# Patient Record
Sex: FEMALE | Race: ASIAN | URBAN - METROPOLITAN AREA
[De-identification: names, ages, dates, MRNs, and addresses within clinical notes are randomized per-mention and may not be internally consistent; named-entity substitution may affect disease eponyms.]

---

## 2022-10-04 ENCOUNTER — EMERGENCY (EMERGENCY)
Facility: HOSPITAL | Age: 27
LOS: 1 days | Discharge: HOME | End: 2022-10-04
Admitting: EMERGENCY MEDICINE

## 2022-10-04 VITALS
SYSTOLIC BLOOD PRESSURE: 101 MMHG | DIASTOLIC BLOOD PRESSURE: 67 MMHG | RESPIRATION RATE: 18 BRPM | TEMPERATURE: 98 F | HEART RATE: 95 BPM | OXYGEN SATURATION: 100 %

## 2022-10-04 VITALS
SYSTOLIC BLOOD PRESSURE: 123 MMHG | OXYGEN SATURATION: 98 % | WEIGHT: 149.91 LBS | TEMPERATURE: 99 F | DIASTOLIC BLOOD PRESSURE: 87 MMHG | RESPIRATION RATE: 18 BRPM | HEART RATE: 95 BPM

## 2022-10-04 LAB
ALBUMIN SERPL ELPH-MCNC: 3.7 G/DL — SIGNIFICANT CHANGE UP (ref 3.4–5)
ALP SERPL-CCNC: 64 U/L — SIGNIFICANT CHANGE UP (ref 40–120)
ALT FLD-CCNC: 10 U/L — LOW (ref 12–42)
ANION GAP SERPL CALC-SCNC: 8 MMOL/L — LOW (ref 9–16)
APPEARANCE UR: CLEAR — SIGNIFICANT CHANGE UP
AST SERPL-CCNC: 14 U/L — LOW (ref 15–37)
BACTERIA # UR AUTO: PRESENT /HPF
BASOPHILS # BLD AUTO: 0.02 K/UL — SIGNIFICANT CHANGE UP (ref 0–0.2)
BASOPHILS NFR BLD AUTO: 0.4 % — SIGNIFICANT CHANGE UP (ref 0–2)
BILIRUB SERPL-MCNC: 0.9 MG/DL — SIGNIFICANT CHANGE UP (ref 0.2–1.2)
BILIRUB UR-MCNC: NEGATIVE — SIGNIFICANT CHANGE UP
BUN SERPL-MCNC: 13 MG/DL — SIGNIFICANT CHANGE UP (ref 7–23)
CALCIUM SERPL-MCNC: 8.8 MG/DL — SIGNIFICANT CHANGE UP (ref 8.5–10.5)
CHLORIDE SERPL-SCNC: 104 MMOL/L — SIGNIFICANT CHANGE UP (ref 96–108)
CO2 SERPL-SCNC: 26 MMOL/L — SIGNIFICANT CHANGE UP (ref 22–31)
COLOR SPEC: YELLOW — SIGNIFICANT CHANGE UP
CREAT SERPL-MCNC: 0.88 MG/DL — SIGNIFICANT CHANGE UP (ref 0.5–1.3)
DIFF PNL FLD: ABNORMAL
EGFR: 93 ML/MIN/1.73M2 — SIGNIFICANT CHANGE UP
EOSINOPHIL # BLD AUTO: 0.06 K/UL — SIGNIFICANT CHANGE UP (ref 0–0.5)
EOSINOPHIL NFR BLD AUTO: 1.1 % — SIGNIFICANT CHANGE UP (ref 0–6)
EPI CELLS # UR: SIGNIFICANT CHANGE UP /HPF (ref 0–5)
GLUCOSE SERPL-MCNC: 105 MG/DL — HIGH (ref 70–99)
GLUCOSE UR QL: NEGATIVE — SIGNIFICANT CHANGE UP
HCG SERPL-ACNC: 1 MIU/ML — SIGNIFICANT CHANGE UP
HCT VFR BLD CALC: 41.8 % — SIGNIFICANT CHANGE UP (ref 34.5–45)
HGB BLD-MCNC: 14.1 G/DL — SIGNIFICANT CHANGE UP (ref 11.5–15.5)
IMM GRANULOCYTES NFR BLD AUTO: 0.2 % — SIGNIFICANT CHANGE UP (ref 0–0.9)
KETONES UR-MCNC: NEGATIVE — SIGNIFICANT CHANGE UP
LACTATE SERPL-SCNC: 0.9 MMOL/L — SIGNIFICANT CHANGE UP (ref 0.4–2)
LEUKOCYTE ESTERASE UR-ACNC: ABNORMAL
LIDOCAIN IGE QN: 92 U/L — SIGNIFICANT CHANGE UP (ref 73–393)
LYMPHOCYTES # BLD AUTO: 1.37 K/UL — SIGNIFICANT CHANGE UP (ref 1–3.3)
LYMPHOCYTES # BLD AUTO: 24.6 % — SIGNIFICANT CHANGE UP (ref 13–44)
MCHC RBC-ENTMCNC: 31.5 PG — SIGNIFICANT CHANGE UP (ref 27–34)
MCHC RBC-ENTMCNC: 33.7 GM/DL — SIGNIFICANT CHANGE UP (ref 32–36)
MCV RBC AUTO: 93.3 FL — SIGNIFICANT CHANGE UP (ref 80–100)
MONOCYTES # BLD AUTO: 0.39 K/UL — SIGNIFICANT CHANGE UP (ref 0–0.9)
MONOCYTES NFR BLD AUTO: 7 % — SIGNIFICANT CHANGE UP (ref 2–14)
NEUTROPHILS # BLD AUTO: 3.71 K/UL — SIGNIFICANT CHANGE UP (ref 1.8–7.4)
NEUTROPHILS NFR BLD AUTO: 66.7 % — SIGNIFICANT CHANGE UP (ref 43–77)
NITRITE UR-MCNC: POSITIVE
NRBC # BLD: 0 /100 WBCS — SIGNIFICANT CHANGE UP (ref 0–0)
PH UR: 6 — SIGNIFICANT CHANGE UP (ref 5–8)
PLATELET # BLD AUTO: 328 K/UL — SIGNIFICANT CHANGE UP (ref 150–400)
POTASSIUM SERPL-MCNC: 3.6 MMOL/L — SIGNIFICANT CHANGE UP (ref 3.5–5.3)
POTASSIUM SERPL-SCNC: 3.6 MMOL/L — SIGNIFICANT CHANGE UP (ref 3.5–5.3)
PROT SERPL-MCNC: 7.6 G/DL — SIGNIFICANT CHANGE UP (ref 6.4–8.2)
PROT UR-MCNC: 30 MG/DL
RBC # BLD: 4.48 M/UL — SIGNIFICANT CHANGE UP (ref 3.8–5.2)
RBC # FLD: 11.9 % — SIGNIFICANT CHANGE UP (ref 10.3–14.5)
RBC CASTS # UR COMP ASSIST: ABNORMAL /HPF
SODIUM SERPL-SCNC: 138 MMOL/L — SIGNIFICANT CHANGE UP (ref 132–145)
SP GR SPEC: <=1.005 — SIGNIFICANT CHANGE UP (ref 1–1.03)
UROBILINOGEN FLD QL: 0.2 E.U./DL — SIGNIFICANT CHANGE UP
WBC # BLD: 5.56 K/UL — SIGNIFICANT CHANGE UP (ref 3.8–10.5)
WBC # FLD AUTO: 5.56 K/UL — SIGNIFICANT CHANGE UP (ref 3.8–10.5)
WBC UR QL: ABNORMAL /HPF

## 2022-10-04 PROCEDURE — 99285 EMERGENCY DEPT VISIT HI MDM: CPT

## 2022-10-04 PROCEDURE — 74177 CT ABD & PELVIS W/CONTRAST: CPT | Mod: 26,59

## 2022-10-04 RX ORDER — NITROFURANTOIN MACROCRYSTAL 50 MG
1 CAPSULE ORAL
Qty: 10 | Refills: 0
Start: 2022-10-04 | End: 2022-10-08

## 2022-10-04 RX ORDER — CEFTRIAXONE 500 MG/1
1000 INJECTION, POWDER, FOR SOLUTION INTRAMUSCULAR; INTRAVENOUS ONCE
Refills: 0 | Status: COMPLETED | OUTPATIENT
Start: 2022-10-04 | End: 2022-10-04

## 2022-10-04 RX ORDER — SODIUM CHLORIDE 9 MG/ML
1000 INJECTION INTRAMUSCULAR; INTRAVENOUS; SUBCUTANEOUS ONCE
Refills: 0 | Status: COMPLETED | OUTPATIENT
Start: 2022-10-04 | End: 2022-10-04

## 2022-10-04 RX ADMIN — SODIUM CHLORIDE 1000 MILLILITER(S): 9 INJECTION INTRAMUSCULAR; INTRAVENOUS; SUBCUTANEOUS at 12:14

## 2022-10-04 RX ADMIN — CEFTRIAXONE 100 MILLIGRAM(S): 500 INJECTION, POWDER, FOR SOLUTION INTRAMUSCULAR; INTRAVENOUS at 14:22

## 2022-10-04 NOTE — ED PROVIDER NOTE - PROGRESS NOTE DETAILS
Neg ABD ct  UA shows positive nitrated w/ 5-10 WBCs  Will treat for UTI - macrobid RX sent  Return precautions discussed  Pt stable on DC Neg ABD ct  UA shows positive nitrated w/ 5-10 WBCs  Will treat for UTI, first dose of CTX given here - macrobid RX sent  Return precautions discussed  Pt stable on DC

## 2022-10-04 NOTE — ED PROVIDER NOTE - CLINICAL SUMMARY MEDICAL DECISION MAKING FREE TEXT BOX
If you have any concerns following your Procedure/Surgery and cannot reach your physician's office, please contact [facility] at [phone] for assistance.        Care After Anesthesia or Sedation    After discharge  • Due to the medicine given, someone must drive you home. It is strongly recommended to have someone stay with you at home the day of discharge and the night after surgery.  • If you have infants or small children at home, please have someone help you for at least 24 hours after your surgery.  • Do not drive for at least 24 hours after surgery (or as told by your doctor).  • Rest for the remainder of the day. Go up and down stairs slowly.  • Do not smoke after surgery. Smoking can delay healing.  • Do not operate heavy or potential harmful equipment.  • Do not make legally binding decisions.  • Do not drink alcohol for 24 hours.    Diet   • Drink plenty of fluids (6 to 8 glasses of water a day).  • Resume your regular diet as able.  • Avoid greasy or spicy foods for 24 hours.   • Start eating a bland diet (toast, gelatin, 7-up, hot cereal, crackers, sherbet, broth soup).      Nausea   • Nausea may be expected for the first 24 to 48 hours.  • Drink small amounts of fluids such as water or sports drink  • Try sucking on hard candy      Urination  • The effects of anesthetics may cause some people to have trouble passing urine the day of surgery. Drink a lot of fluids to help prevent this.  • Try to urinate within 8 hours of surgery.  • If you are unable to pass urine and feel like you need to, call your doctor or the hospital.    Pain control  • Some incisions are injected with a long acting local anesthetic; it will wear off in 4 to 6 hours. You can expect to have some pain at this time.  • Treat your pain with the prescribed pain medicine before it wears off. Do not wait until your pain becomes severe.  • Ask your nurse when you had your last pain medicine, so you know when you can  take another one after you get home.    Call your doctor if you have:  • Nausea and vomiting that does not stop  • Fever over 101° F  • Pain not relieved by pain medication  • If you are unable to pass your urine or you have not passed urine in the last 8 hours.  • Unusual changes in behavior  • Dizziness  • Hives  If you are not able to reach your doctor, you may call the emergency department.    Wound Healing: What You Should Know    Do I have an infection?  Your body may show signs your wound is infected. If you see one or more of these signs, please call your health care provider:  • Redness and swelling - Increased redness and swelling around the wound (some redness is expected in the first 48 hours).  • Warmth - The area around the wound is warmer than the surrounding skin.  • Fever - Your temperature is above 101º F or stays above 100º F.  • Odor - A new or stronger, sweet or foul smell coming from the wound.  • Swelling - Swelling spreading to other areas.  • Drainage - Large amounts of drainage that involves blood, clear fluid or pus from the wound. Or, the drainage amount increases or changes color. (It is normal to have a small amount of drainage.)  • Pain - Increase in your pain or change in the location of the pain.  • Separation - Any place where the incision is  or opening.    How can I help prevent the spread of infection when I take care of my wound?  1. Use good handwashing techniques before and after contact with your wound. Here are the steps to follow:  Handwashing with soap and water:  • Wet hands with warm water and apply soap.  • Rub well over all surfaces for at least 15 seconds.  • Rinse well under water.  • Dry hands with clean towels.  • Turn off faucet with clean towels to prevent reinfecting hands.  Handwashing with a waterless alcohol-based product or gel:  • Apply approximately a nickel sized amount of product to palm of hand.  • Rub hands together, covering all surfaces  including nails, until product evaporates, about 10 to 15 seconds.  • Use clean latex or vinyl gloves if cleaning or touching wound surface.  • Keep nails short and remove nail polish. Long nails or polish can harbor bacteria.  • Keep jewelry clean or remove during wound care.  • Use hypoallergenic lotions with anti-bacterial agents on skin surrounding wound to maintain moisture and prevent irritation.    What else can I do to help my wound heal quickly and prevent other wounds?  • Stay active - Activity and exercise promote good circulation, which leads to wound healing.  • Avoid smoking - Smoking narrows your blood vessels, which decreases both the blood supply to your wound and the amount of oxygen in your blood. This will decrease your ability to heal and increase your chance of infection.  • Avoid alcohol - Alcohol decreases the ability of your body to fight infection.  • Avoid sitting with legs or ankles crossed - This can compress the blood vessels in your legs and decrease the blood supply to your wound.  • Eat a balanced diet - If you are unable to eat a balanced diet or required foods, you may benefit from a vitamin or mineral supplement.    Pain Management  Special Note: Be sure to follow any specific post-op instructions from your surgeon or nurse.     Once you’re home, you may have some pain, since even minor surgery causes swelling and breakdown of tissue. When it comes to effective pain management, the tips you learned in the hospital also work at home. To get the best pain relief possible, remember these points:    Use your medication only as directed  · If your pain is not relieved or if it gets worse, call your doctor.  · If pain lessens, try taking your medication less often.  Remember that medications need time to work  · Most pain relievers taken by mouth need at least 20-30 minutes to take effect.  · Take pain medication at regular times as directed. Don’t wait until the pain gets bad to take  it.  Time your medication  · Try to time your medication so that you take it before beginning an activity, such as dressing or sitting at the table for dinner.  · Taking your medication at night may help you get a good night’s rest.  Eat lots of fruit and vegetables  · Constipation is a common side effect with some pain medications. Eating fruit and vegetables can help.  · Drink lots of fluids.  Avoid drinking alcohol while taking pain medication  · Mixing alcohol and pain medication can cause dizziness and slow your respiratory system. It can even be fatal.  · Avoid driving or operating machinery while taking pain medication.  In addition, other ways to decrease pain  •    Relaxing techniques-slow, deep breathing, imagery, watching TV, listening to music   •    Heat or cold   •    Massage   •    Rest and changing your position in bed          25 y/o F presents to ED with abdominal pain for 4 days. She was found to have some tenderness on exam. The scab mentioned by pt is also appreciated; however, the wound does not appear to go more than 2 mm deep and there were no open wounds noted. Will obtain abdominal CT, send UA and basic medical labs, and give IV fluids. Pt states she is okay at this moment and does not require any pain medications. Will reassess. Dispo pending medical workup.

## 2022-10-04 NOTE — ED ADULT NURSE NOTE - OBJECTIVE STATEMENT
Patient presents with 4 days of umbilical pain worsening with movement and better when lying down. Also decrease appetite and difficulty passing gas. Denies fever, chill, vomiting, nausea, CP, SOB. [facility] noticed [facility] from [facility]icus, sent in for CT scan and US.

## 2022-10-04 NOTE — ED ADULT TRIAGE NOTE - CHIEF COMPLAINT QUOTE
Pt sent in by urgent care complaining of abd pain x 4 days around umbilicus. As per urgent care debris and deep hole at umbilicus noted. Pt states that she is having trouble passing gas. Pt denies nausea, vomiting, diarrhea.

## 2022-10-04 NOTE — ED PROVIDER NOTE - PATIENT PORTAL LINK FT
You can access the FollowMyHealth Patient Portal offered by [facility] by registering at the following website: [email or web address]. By joining [employer]’s FollowMyHealth portal, you will also be able to view your health information using other applications (apps) compatible with our system.

## 2022-10-04 NOTE — ED PROVIDER NOTE - SKIN, MLM
Small scab noted along the upper inner portion of the belly button. Wound appears to go approximately 2 mm down. No open wounds noted.

## 2022-10-04 NOTE — ED PROVIDER NOTE - INTERNATIONAL TRAVEL
No Current and Past Psychiatric Diagnoses/Presenting Symptoms/Treatment Related Factors/Activating Events/Stressors

## 2022-10-04 NOTE — ED PROVIDER NOTE - OBJECTIVE STATEMENT
27 y/o F with no stated PMHx presents to ED c/o periumbilical pain for the past 4 days. She reports the pain is sharp and intermittent. Occasionally she feels pain in the LLQ as well. Pt went to [facility] today; while there, they examined her belly button and noticed a scab. When it was removed, the provider there was concerned for possible deeper involvement of the wound. Pt does not recall any trauma to this area and does not know how long the scab has been present. She endorses prior abdominal surgery as a child, but cannot recall what it was and does not have any visible abdominal scars. Denies any associated N/V/D, fevers, chills, dizziness, CP, or SOB.

## 2022-10-05 LAB
CULTURE RESULTS: SIGNIFICANT CHANGE UP
SPECIMEN SOURCE: SIGNIFICANT CHANGE UP

## 2022-10-07 DIAGNOSIS — R10.33 PERIUMBILICAL PAIN: ICD-10-CM

## 2022-10-07 DIAGNOSIS — N39.0 URINARY TRACT INFECTION, SITE NOT SPECIFIED: ICD-10-CM
